# Patient Record
Sex: FEMALE | Race: WHITE | NOT HISPANIC OR LATINO | Employment: FULL TIME | URBAN - METROPOLITAN AREA
[De-identification: names, ages, dates, MRNs, and addresses within clinical notes are randomized per-mention and may not be internally consistent; named-entity substitution may affect disease eponyms.]

---

## 2021-05-25 ENCOUNTER — RECORDS - HEALTHEAST (OUTPATIENT)
Dept: ADMINISTRATIVE | Facility: CLINIC | Age: 46
End: 2021-05-25

## 2022-10-28 ENCOUNTER — HOSPITAL ENCOUNTER (EMERGENCY)
Facility: CLINIC | Age: 47
Discharge: HOME | End: 2022-10-28
Attending: EMERGENCY MEDICINE | Admitting: EMERGENCY MEDICINE
Payer: COMMERCIAL

## 2022-10-28 ENCOUNTER — APPOINTMENT (OUTPATIENT)
Dept: CT IMAGING | Facility: CLINIC | Age: 47
End: 2022-10-28
Attending: EMERGENCY MEDICINE
Payer: COMMERCIAL

## 2022-10-28 VITALS
HEART RATE: 98 BPM | WEIGHT: 164 LBS | RESPIRATION RATE: 26 BRPM | BODY MASS INDEX: 28 KG/M2 | DIASTOLIC BLOOD PRESSURE: 86 MMHG | OXYGEN SATURATION: 96 % | TEMPERATURE: 98.6 F | SYSTOLIC BLOOD PRESSURE: 135 MMHG | HEIGHT: 64 IN

## 2022-10-28 DIAGNOSIS — J01.00 ACUTE NON-RECURRENT MAXILLARY SINUSITIS: ICD-10-CM

## 2022-10-28 LAB
ANION GAP SERPL CALCULATED.3IONS-SCNC: 12 MMOL/L (ref 5–18)
APTT PPP: 25 SECONDS (ref 22–38)
ATRIAL RATE - MUSE: 90 BPM
BUN SERPL-MCNC: 13 MG/DL (ref 8–22)
CALCIUM SERPL-MCNC: 9.2 MG/DL (ref 8.5–10.5)
CHLORIDE BLD-SCNC: 104 MMOL/L (ref 98–107)
CO2 SERPL-SCNC: 22 MMOL/L (ref 22–31)
CREAT SERPL-MCNC: 0.72 MG/DL (ref 0.6–1.1)
DIASTOLIC BLOOD PRESSURE - MUSE: NORMAL MMHG
ERYTHROCYTE [DISTWIDTH] IN BLOOD BY AUTOMATED COUNT: 13 % (ref 10–15)
GFR SERPL CREATININE-BSD FRML MDRD: >90 ML/MIN/1.73M2
GLUCOSE BLD-MCNC: 94 MG/DL (ref 70–125)
GLUCOSE BLDC GLUCOMTR-MCNC: 90 MG/DL (ref 70–99)
HCT VFR BLD AUTO: 40.3 % (ref 35–47)
HGB BLD-MCNC: 13.7 G/DL (ref 11.7–15.7)
INR PPP: 1.04 (ref 0.85–1.15)
INTERPRETATION ECG - MUSE: NORMAL
MCH RBC QN AUTO: 29.1 PG (ref 26.5–33)
MCHC RBC AUTO-ENTMCNC: 34 G/DL (ref 31.5–36.5)
MCV RBC AUTO: 86 FL (ref 78–100)
P AXIS - MUSE: 60 DEGREES
PLATELET # BLD AUTO: 291 10E3/UL (ref 150–450)
POTASSIUM BLD-SCNC: 3.6 MMOL/L (ref 3.5–5)
PR INTERVAL - MUSE: 138 MS
QRS DURATION - MUSE: 88 MS
QT - MUSE: 380 MS
QTC - MUSE: 464 MS
R AXIS - MUSE: 6 DEGREES
RBC # BLD AUTO: 4.7 10E6/UL (ref 3.8–5.2)
SODIUM SERPL-SCNC: 138 MMOL/L (ref 136–145)
SYSTOLIC BLOOD PRESSURE - MUSE: NORMAL MMHG
T AXIS - MUSE: 48 DEGREES
TROPONIN I SERPL-MCNC: <0.01 NG/ML (ref 0–0.29)
VENTRICULAR RATE- MUSE: 90 BPM
WBC # BLD AUTO: 9.7 10E3/UL (ref 4–11)

## 2022-10-28 PROCEDURE — 99207 PR NO CHARGE LOS: CPT | Performed by: NURSE PRACTITIONER

## 2022-10-28 PROCEDURE — 250N000011 HC RX IP 250 OP 636: Performed by: EMERGENCY MEDICINE

## 2022-10-28 PROCEDURE — 93005 ELECTROCARDIOGRAM TRACING: CPT | Performed by: EMERGENCY MEDICINE

## 2022-10-28 PROCEDURE — 80048 BASIC METABOLIC PNL TOTAL CA: CPT | Performed by: EMERGENCY MEDICINE

## 2022-10-28 PROCEDURE — 85027 COMPLETE CBC AUTOMATED: CPT | Performed by: EMERGENCY MEDICINE

## 2022-10-28 PROCEDURE — 36415 COLL VENOUS BLD VENIPUNCTURE: CPT | Performed by: EMERGENCY MEDICINE

## 2022-10-28 PROCEDURE — 84484 ASSAY OF TROPONIN QUANT: CPT | Performed by: EMERGENCY MEDICINE

## 2022-10-28 PROCEDURE — 99285 EMERGENCY DEPT VISIT HI MDM: CPT | Mod: 25

## 2022-10-28 PROCEDURE — 85730 THROMBOPLASTIN TIME PARTIAL: CPT | Performed by: EMERGENCY MEDICINE

## 2022-10-28 PROCEDURE — 85610 PROTHROMBIN TIME: CPT | Performed by: EMERGENCY MEDICINE

## 2022-10-28 PROCEDURE — 70498 CT ANGIOGRAPHY NECK: CPT

## 2022-10-28 PROCEDURE — 70496 CT ANGIOGRAPHY HEAD: CPT

## 2022-10-28 RX ORDER — IOPAMIDOL 755 MG/ML
75 INJECTION, SOLUTION INTRAVASCULAR ONCE
Status: COMPLETED | OUTPATIENT
Start: 2022-10-28 | End: 2022-10-28

## 2022-10-28 RX ADMIN — IOPAMIDOL 75 ML: 755 INJECTION, SOLUTION INTRAVENOUS at 10:00

## 2022-10-28 ASSESSMENT — ENCOUNTER SYMPTOMS
HEADACHES: 0
FEVER: 0
NUMBNESS: 1
DIZZINESS: 0
DIAPHORESIS: 0
CHILLS: 0

## 2022-10-28 ASSESSMENT — ACTIVITIES OF DAILY LIVING (ADL): ADLS_ACUITY_SCORE: 35

## 2022-10-28 NOTE — CONSULTS
"      [facility]    Stroke Telephone Note    I was called by Ekaterina Odom on 10/28/22 regarding patient Eboni Lamar. The patient is a 46 year old female with a PMHx significant for pre-DM, HTN, radiculitis, and GERD who presents with ~ 1 hr of left facial numbness in the V2/V3 distribution.     Stroke Code Data (for stroke code without tele)  Stroke code activated 10/28/22   0950   Stroke provider first response  10/28/22   0952            Last known normal 10/28/22   0900        Time of discovery   (or onset of symptoms) 10/28/22   0900   Head CT read by Stroke Neuro Dr/Provider 10/28/22   1025   Was stroke code de-escalated? Yes 10/28/22 1025          Imaging Findings   No acute pathology on CT  CTA head/neck w/out     Intravenous Thrombolysis  Not given due to:   - minor/isolated/quickly resolving symptoms    Endovascular Treatment  Not initiated due to absence of proximal vessel occlusion    Impression  Left facial numbness, low suspicion for stroke, consider isolated neuropathy     Recommendations   - MRI w/ & w/out contrast and with coronal DWI    My recommendations are based on the information provided over the phone by Eboni Lamar's in-person providers. They are not intended to replace the clinical judgment of her in-person providers. I was not requested to personally see or examine the patient at this time.    LORE Perkins, CNP  Vascular Neurology  To page me or covering stroke neurology team member, click here: AMCOM   Choose \"On Call\" tab at top, then search dropdown box for \"Neurology Adult\", select location, press Enter, then look for stroke/neuro ICU/telestroke.      "

## 2022-10-28 NOTE — ED TRIAGE NOTES
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  Also states that she has left leg numbness that started after [facility] trip to [place].  Was in [place] prior to [facility], spent 9 days and multiple guajardo.    Dr Odom assessed in T2 and called T1 stroke code before triage note was put in coputer.  ross

## 2022-10-28 NOTE — ED PROVIDER NOTES
EMERGENCY DEPARTMENT ENCOUNTER      NAME: Eboni Lamar  AGE: 46 year old female  YOB: 1975  MRN: [id number]  EVALUATION DATE & TIME: No admission date for patient encounter.    PCP: No primary care provider on file.    ED PROVIDER: Ekaterina Odom MD      Chief Complaint   Patient presents with     Numbness     Left sided facial numbness in the last hour         FINAL IMPRESSION:  1. Acute non-recurrent maxillary sinusitis          ED COURSE & MEDICAL DECISION MAKIN:50 AM I met with patient for initial interview and encounter. PPE worn includes surgical mask and exam gloves.   9:54 AM I spoke to stroke neurology.   10:06 AM I spoke to radiology. CT scan negative. They are struggling with IV, therefore it is going to take awhile.   10:29 AM I spoke to radiologist. Deescalate stroke code.   10:32 AM I spoke and updated patient about lab and imaging results.   11:16 AM Patient denies MRI. She wants to get treated for sinus infection and get discharged. We discussed plans for discharge including supportive cares, symptomatic treatment, outpatient follow up, and reasons to return to the emergency department.     Pertinent Labs & Imaging studies reviewed. (See chart for details)  46 year old female presents to the Emergency Department for evaluation of paresthesias along the left side of her cheek and left side of her jaw.  She reports that a week ago she developed numbness and tingling in her left anterior thigh.  She attributes this to walking at [place] and riding roller coasters.  However, today she developed the numbness and tingling in the left side of her face an hour prior to arrival and it was recommended to her that she be seen in the emergency department.  She states that she has previously had a sinus infection and had similar symptoms.  She has been more congested than normal.  Initial stroke code was called given her symptoms, CT of the head and neck were unremarkable.  I spoke  with the stroke neurology team who recommended an MRI of her brain with and without contrast.  The patient declined stating she just wants treatment for sinusitis.  I discussed with her that we will not completely be able to rule out an acute stroke which can cause significant disabilities, but she understands the risks and again would like to leave AGAINST MEDICAL ADVICE with only treatment for sinusitis.  Patient was prescribed antibiotics and encouraged to return if her symptoms acutely change or worsen. Patient did sign AMA form fully understanding the risk of leaving prior to full stroke rule out work-up.       At the conclusion of the encounter I discussed the results of all of the tests and the disposition. The questions were answered. The patient or family acknowledged understanding and was agreeable with the care plan.         MEDICATIONS GIVEN IN THE EMERGENCY:  Medications   iopamidol (ISOVUE-370) solution 75 mL (75 mLs Intravenous Given 10/28/22 1000)       NEW PRESCRIPTIONS STARTED AT TODAY'S ER VISIT  Discharge Medication List as of 10/28/2022 11:21 AM      START taking these medications    Details   amoxicillin-clavulanate (AUGMENTIN) 875-125 MG tablet Take 1 tablet by mouth 2 times daily for 7 days, Disp-14 tablet, R-0, Local Print                =================================================================    HPI    Patient information was obtained from: Patient    Use of : N/A         Eboni Lamar is a 46 year old female with a pertinent history of GERD, prediabetics and hypertension who presents to this ED via walk in for evaluation of left side facial numbness.     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 Patient reports numbness on left cheek and jaw. Patient notice left thing tingling but thought it was from walking and going on too many rides at [facility]. Patient states she was in [place] prior to [place], spent  "9 days and explored multiple guajardo. Patient is not on blood thinners. Patient denies headaches, dizziness, fever, chills, sweats, and any other complaints or concerns at the moment.        REVIEW OF SYSTEMS   Review of Systems   Constitutional: Negative for chills, diaphoresis and fever.   Neurological: Positive for numbness (left facial cheek and jaw). Negative for dizziness and headaches.   All other systems reviewed and are negative.       PAST MEDICAL HISTORY:  History reviewed. No pertinent past medical history.    PAST SURGICAL HISTORY:  History reviewed. No pertinent surgical history.        CURRENT MEDICATIONS:    amoxicillin-clavulanate (AUGMENTIN) 875-125 MG tablet        ALLERGIES:  No Known Allergies    FAMILY HISTORY:  No family history on file.    SOCIAL HISTORY:   Social History     Socioeconomic History     Marital status: Single       VITALS:  /86   Pulse 98   Temp 98.6  F (37  C) (Oral)   Resp 26   Ht 1.626 m (5' 4\")   Wt 74.4 kg (164 lb)   SpO2 96%   BMI 28.15 kg/m      PHYSICAL EXAM    Gen:  Alert, awake, NAD  HENT:  Head atraumatic, PERRL, EOMI, no meningismus  Respiratory:  CTA, normal respiratory rate  Cardiovascular:  Regular rate and rhythm, no murmur  Abdomen:  Soft, nontender, normoactive bowel sounds  Musculoskeletal:  FROM in all extremities with no tenderness  Integument:  No rash, warm, dry  Neuro: A & O x 3, no dysdiadochokinesia, negative Romberg, no pronator drift, no nystagmus, visual fields full to confrontation, normal gait, +5/5 strength in all extremities. Decrease sensation in left V2 and V3 distrubution, otherwise CN II - XII are intact.      LAB:  All pertinent labs reviewed and interpreted.  Results for orders placed or performed during the hospital encounter of 10/28/22   CTA Head Neck with Contrast    Impression    IMPRESSION:   HEAD CT:  1.  No acute intracranial process.    HEAD CTA:   1.  No significant stenosis, aneurysm, or high flow vascular " malformation identified.  2.  Variant [place] anatomy as above.    NECK CTA:  1.  No hemodynamically significant stenosis in the neck vessels by NASCET criteria.  2.  No evidence for dissection.  3.  Disc degeneration at C5-C6 as above.    Preliminary noncontrast head CT and CTA findings were called to Dr. Odom at 1007 hours and 1027 hours respectively 10/28/2022.   CBC with platelets   Result Value Ref Range    WBC Count 9.7 4.0 - 11.0 10e3/uL    RBC Count 4.70 3.80 - 5.20 10e6/uL    Hemoglobin 13.7 11.7 - 15.7 g/dL    Hematocrit 40.3 35.0 - 47.0 %    MCV 86 78 - 100 fL    MCH 29.1 26.5 - 33.0 pg    MCHC 34.0 31.5 - 36.5 g/dL    RDW 13.0 10.0 - 15.0 %    Platelet Count 291 150 - 450 10e3/uL   Partial thromboplastin time   Result Value Ref Range    aPTT 25 22 - 38 Seconds   Result Value Ref Range    INR 1.04 0.85 - 1.15   Basic metabolic panel   Result Value Ref Range    Sodium 138 136 - 145 mmol/L    Potassium 3.6 3.5 - 5.0 mmol/L    Chloride 104 98 - 107 mmol/L    Carbon Dioxide (CO2) 22 22 - 31 mmol/L    Anion Gap 12 5 - 18 mmol/L    Urea Nitrogen 13 8 - 22 mg/dL    Creatinine 0.72 0.60 - 1.10 mg/dL    Calcium 9.2 8.5 - 10.5 mg/dL    Glucose 94 70 - 125 mg/dL    GFR Estimate >90 >60 mL/min/1.73m2   Result Value Ref Range    Troponin I <0.01 0.00 - 0.29 ng/mL   Glucose by meter   Result Value Ref Range    GLUCOSE BY METER POCT 90 70 - 99 mg/dL   ECG 12-lead with MUSE   Result Value Ref Range    Systolic Blood Pressure  mmHg    Diastolic Blood Pressure  mmHg    Ventricular Rate 90 BPM    Atrial Rate 90 BPM    TX Interval 138 ms    QRS Duration 88 ms     ms    QTc 464 ms    P Axis 60 degrees    R AXIS 6 degrees    T Axis 48 degrees    Interpretation ECG       Sinus rhythm with sinus arrhythmia  Normal ECG  No previous ECGs available  Confirmed by SEE ED PROVIDER NOTE FOR, ECG INTERPRETATION ([id number]),  JEANNETTE HARRELL ([id number]) on 10/28/2022 11:16:19 AM         RADIOLOGY:  Reviewed all  pertinent imaging. Please see official radiology report.  CTA Head Neck with Contrast   Final Result   IMPRESSION:    HEAD CT:   1.  No acute intracranial process.      HEAD CTA:    1.  No significant stenosis, aneurysm, or high flow vascular malformation identified.   2.  Variant [place] anatomy as above.      NECK CTA:   1.  No hemodynamically significant stenosis in the neck vessels by NASCET criteria.   2.  No evidence for dissection.   3.  Disc degeneration at C5-C6 as above.      Preliminary noncontrast head CT and CTA findings were called to Dr. Odom at 1007 hours and 1027 hours respectively 10/28/2022.          EKG:    Performed at: 28-OCT-2022 10:19    Impression: Sinus rhythm with sinus arrhythmia     Rate: 90  Rhythm: Sinus rhythm with sinus arrhythmia     VA Interval: 138  QRS Interval: 88  QTc Interval: 464  ST Changes: none  Comparison: No previous ECGs available.      I have independently reviewed and interpreted the EKG(s) documented above.    PROCEDURES:   [employer] Stroke Scale  Exam Interval: Baseline   Score    Level of consciousness: (0)   Alert, keenly responsive    LOC questions: (0)   Answers both questions correctly    LOC commands: (0)   Performs both tasks correctly    Best gaze: (0)   Normal    Visual: (0)   No visual loss    Facial palsy: (0)   Normal symmetrical movements    Motor arm (left): (0)   No drift    Motor arm (right): (0)   No drift    Motor leg (left): (0)   No drift    Motor leg (right): (0)   No drift    Limb ataxia: (0)   Absent    Sensory: (1)   Mild to moderate sensory loss    Best language: (0)   Normal- no aphasia    Dysarthria: (0)   Normal    Extinction and inattention: (0)   No abnormality        Total Score:  1           I, Della Felder, am serving as a scribe to document services personally performed by Ekaterina Odom, based on my observation and the provider's statements to me. I, Ekaterina Odom MD, attest that Della Felder  is acting in a scribe capacity, has observed my performance of the services and has documented them in accordance with my direction.    Ekaterina Odom MD  Emergency Medicine  [facility] EMERGENCY ROOM  [address] [facility] [address] [phone]      Ekaterina Odom MD  10/28/22 3476